# Patient Record
Sex: FEMALE | Race: WHITE | NOT HISPANIC OR LATINO | Employment: UNEMPLOYED | URBAN - METROPOLITAN AREA
[De-identification: names, ages, dates, MRNs, and addresses within clinical notes are randomized per-mention and may not be internally consistent; named-entity substitution may affect disease eponyms.]

---

## 2024-08-01 ENCOUNTER — TELEPHONE (OUTPATIENT)
Dept: SURGERY | Facility: CLINIC | Age: 29
End: 2024-08-01

## 2024-08-01 ENCOUNTER — APPOINTMENT (OUTPATIENT)
Dept: RADIOLOGY | Facility: HOSPITAL | Age: 29
End: 2024-08-01

## 2024-08-01 ENCOUNTER — HOSPITAL ENCOUNTER (EMERGENCY)
Facility: HOSPITAL | Age: 29
Discharge: HOME | End: 2024-08-01

## 2024-08-01 ENCOUNTER — DOCUMENTATION (OUTPATIENT)
Dept: SURGERY | Facility: CLINIC | Age: 29
End: 2024-08-01

## 2024-08-01 ENCOUNTER — APPOINTMENT (OUTPATIENT)
Dept: CARDIOLOGY | Facility: HOSPITAL | Age: 29
End: 2024-08-01

## 2024-08-01 VITALS
SYSTOLIC BLOOD PRESSURE: 122 MMHG | BODY MASS INDEX: 25.76 KG/M2 | WEIGHT: 140 LBS | OXYGEN SATURATION: 99 % | HEIGHT: 62 IN | HEART RATE: 94 BPM | RESPIRATION RATE: 16 BRPM | DIASTOLIC BLOOD PRESSURE: 76 MMHG

## 2024-08-01 DIAGNOSIS — K80.00 CALCULUS OF GALLBLADDER WITH ACUTE CHOLECYSTITIS WITHOUT OBSTRUCTION: Primary | ICD-10-CM

## 2024-08-01 LAB
ALBUMIN SERPL-MCNC: 4.4 G/DL (ref 3.5–5)
ALP BLD-CCNC: 87 U/L (ref 35–125)
ALT SERPL-CCNC: 31 U/L (ref 5–40)
ANION GAP SERPL CALC-SCNC: 10 MMOL/L
APPEARANCE UR: CLEAR
AST SERPL-CCNC: 50 U/L (ref 5–40)
ATRIAL RATE: 69 BPM
BASOPHILS # BLD AUTO: 0.06 X10*3/UL (ref 0–0.1)
BASOPHILS NFR BLD AUTO: 0.5 %
BILIRUB SERPL-MCNC: 0.2 MG/DL (ref 0.1–1.2)
BILIRUB UR STRIP.AUTO-MCNC: NEGATIVE MG/DL
BUN SERPL-MCNC: 10 MG/DL (ref 8–25)
CALCIUM SERPL-MCNC: 9.4 MG/DL (ref 8.5–10.4)
CHLORIDE SERPL-SCNC: 103 MMOL/L (ref 97–107)
CO2 SERPL-SCNC: 23 MMOL/L (ref 24–31)
COLOR UR: ABNORMAL
CREAT SERPL-MCNC: 0.7 MG/DL (ref 0.4–1.6)
EGFRCR SERPLBLD CKD-EPI 2021: >90 ML/MIN/1.73M*2
EOSINOPHIL # BLD AUTO: 0.1 X10*3/UL (ref 0–0.7)
EOSINOPHIL NFR BLD AUTO: 0.9 %
ERYTHROCYTE [DISTWIDTH] IN BLOOD BY AUTOMATED COUNT: 12.2 % (ref 11.5–14.5)
GLUCOSE SERPL-MCNC: 117 MG/DL (ref 65–99)
GLUCOSE UR STRIP.AUTO-MCNC: NORMAL MG/DL
HCG UR QL IA.RAPID: NEGATIVE
HCT VFR BLD AUTO: 41.2 % (ref 36–46)
HGB BLD-MCNC: 13.6 G/DL (ref 12–16)
HOLD SPECIMEN: NORMAL
IMM GRANULOCYTES # BLD AUTO: 0.03 X10*3/UL (ref 0–0.7)
IMM GRANULOCYTES NFR BLD AUTO: 0.3 % (ref 0–0.9)
KETONES UR STRIP.AUTO-MCNC: ABNORMAL MG/DL
LEUKOCYTE ESTERASE UR QL STRIP.AUTO: NEGATIVE
LYMPHOCYTES # BLD AUTO: 2.87 X10*3/UL (ref 1.2–4.8)
LYMPHOCYTES NFR BLD AUTO: 26 %
MAGNESIUM SERPL-MCNC: 2.1 MG/DL (ref 1.6–3.1)
MCH RBC QN AUTO: 31 PG (ref 26–34)
MCHC RBC AUTO-ENTMCNC: 33 G/DL (ref 32–36)
MCV RBC AUTO: 94 FL (ref 80–100)
MONOCYTES # BLD AUTO: 0.95 X10*3/UL (ref 0.1–1)
MONOCYTES NFR BLD AUTO: 8.6 %
NEUTROPHILS # BLD AUTO: 7.02 X10*3/UL (ref 1.2–7.7)
NEUTROPHILS NFR BLD AUTO: 63.7 %
NITRITE UR QL STRIP.AUTO: NEGATIVE
NRBC BLD-RTO: 0 /100 WBCS (ref 0–0)
P AXIS: 6 DEGREES
P OFFSET: 195 MS
P ONSET: 154 MS
PH UR STRIP.AUTO: 6 [PH]
PLATELET # BLD AUTO: 235 X10*3/UL (ref 150–450)
POTASSIUM SERPL-SCNC: 3.8 MMOL/L (ref 3.4–5.1)
PR INTERVAL: 132 MS
PROT SERPL-MCNC: 7.7 G/DL (ref 5.9–7.9)
PROT UR STRIP.AUTO-MCNC: NEGATIVE MG/DL
Q ONSET: 220 MS
QRS COUNT: 12 BEATS
QRS DURATION: 82 MS
QT INTERVAL: 382 MS
QTC CALCULATION(BAZETT): 409 MS
QTC FREDERICIA: 400 MS
R AXIS: 77 DEGREES
RBC # BLD AUTO: 4.39 X10*6/UL (ref 4–5.2)
RBC # UR STRIP.AUTO: NEGATIVE /UL
SODIUM SERPL-SCNC: 136 MMOL/L (ref 133–145)
SP GR UR STRIP.AUTO: 1.02
T AXIS: 52 DEGREES
T OFFSET: 411 MS
UROBILINOGEN UR STRIP.AUTO-MCNC: NORMAL MG/DL
VENTRICULAR RATE: 69 BPM
WBC # BLD AUTO: 11 X10*3/UL (ref 4.4–11.3)

## 2024-08-01 PROCEDURE — 76705 ECHO EXAM OF ABDOMEN: CPT

## 2024-08-01 PROCEDURE — 99285 EMERGENCY DEPT VISIT HI MDM: CPT | Mod: 25

## 2024-08-01 PROCEDURE — 74177 CT ABD & PELVIS W/CONTRAST: CPT

## 2024-08-01 PROCEDURE — 99222 1ST HOSP IP/OBS MODERATE 55: CPT | Performed by: STUDENT IN AN ORGANIZED HEALTH CARE EDUCATION/TRAINING PROGRAM

## 2024-08-01 PROCEDURE — 80053 COMPREHEN METABOLIC PANEL: CPT

## 2024-08-01 PROCEDURE — 36415 COLL VENOUS BLD VENIPUNCTURE: CPT

## 2024-08-01 PROCEDURE — 76705 ECHO EXAM OF ABDOMEN: CPT | Performed by: RADIOLOGY

## 2024-08-01 PROCEDURE — 81003 URINALYSIS AUTO W/O SCOPE: CPT

## 2024-08-01 PROCEDURE — 83735 ASSAY OF MAGNESIUM: CPT

## 2024-08-01 PROCEDURE — 2550000001 HC RX 255 CONTRASTS

## 2024-08-01 PROCEDURE — 85025 COMPLETE CBC W/AUTO DIFF WBC: CPT

## 2024-08-01 PROCEDURE — 93005 ELECTROCARDIOGRAM TRACING: CPT

## 2024-08-01 PROCEDURE — 96375 TX/PRO/DX INJ NEW DRUG ADDON: CPT

## 2024-08-01 PROCEDURE — 74177 CT ABD & PELVIS W/CONTRAST: CPT | Performed by: RADIOLOGY

## 2024-08-01 PROCEDURE — 2500000004 HC RX 250 GENERAL PHARMACY W/ HCPCS (ALT 636 FOR OP/ED)

## 2024-08-01 PROCEDURE — 96361 HYDRATE IV INFUSION ADD-ON: CPT

## 2024-08-01 PROCEDURE — 81025 URINE PREGNANCY TEST: CPT

## 2024-08-01 PROCEDURE — 96374 THER/PROPH/DIAG INJ IV PUSH: CPT

## 2024-08-01 RX ORDER — KETOROLAC TROMETHAMINE 30 MG/ML
15 INJECTION, SOLUTION INTRAMUSCULAR; INTRAVENOUS ONCE
Status: COMPLETED | OUTPATIENT
Start: 2024-08-01 | End: 2024-08-01

## 2024-08-01 RX ORDER — IBUPROFEN 800 MG/1
800 TABLET ORAL 3 TIMES DAILY
Qty: 21 TABLET | Refills: 0 | OUTPATIENT
Start: 2024-08-01 | End: 2024-08-02

## 2024-08-01 RX ORDER — ONDANSETRON 4 MG/1
4 TABLET, FILM COATED ORAL EVERY 6 HOURS
Qty: 12 TABLET | Refills: 0 | Status: SHIPPED | OUTPATIENT
Start: 2024-08-01 | End: 2024-08-04

## 2024-08-01 RX ORDER — ONDANSETRON HYDROCHLORIDE 2 MG/ML
4 INJECTION, SOLUTION INTRAVENOUS ONCE
Status: COMPLETED | OUTPATIENT
Start: 2024-08-01 | End: 2024-08-01

## 2024-08-01 ASSESSMENT — COLUMBIA-SUICIDE SEVERITY RATING SCALE - C-SSRS
1. IN THE PAST MONTH, HAVE YOU WISHED YOU WERE DEAD OR WISHED YOU COULD GO TO SLEEP AND NOT WAKE UP?: NO
6. HAVE YOU EVER DONE ANYTHING, STARTED TO DO ANYTHING, OR PREPARED TO DO ANYTHING TO END YOUR LIFE?: NO
2. HAVE YOU ACTUALLY HAD ANY THOUGHTS OF KILLING YOURSELF?: NO

## 2024-08-01 ASSESSMENT — PAIN DESCRIPTION - LOCATION
LOCATION: ABDOMEN
LOCATION: ABDOMEN

## 2024-08-01 ASSESSMENT — LIFESTYLE VARIABLES
EVER FELT BAD OR GUILTY ABOUT YOUR DRINKING: NO
EVER HAD A DRINK FIRST THING IN THE MORNING TO STEADY YOUR NERVES TO GET RID OF A HANGOVER: NO
HAVE YOU EVER FELT YOU SHOULD CUT DOWN ON YOUR DRINKING: NO
HAVE PEOPLE ANNOYED YOU BY CRITICIZING YOUR DRINKING: NO
TOTAL SCORE: 0

## 2024-08-01 ASSESSMENT — ENCOUNTER SYMPTOMS
HEMATOLOGIC/LYMPHATIC NEGATIVE: 1
ABDOMINAL DISTENTION: 0
ABDOMINAL PAIN: 1
FEVER: 0
ENDOCRINE NEGATIVE: 1
NEUROLOGICAL NEGATIVE: 1
VOMITING: 1
CHILLS: 0
DIARRHEA: 1
EYES NEGATIVE: 1
ALLERGIC/IMMUNOLOGIC NEGATIVE: 1
SHORTNESS OF BREATH: 0
PSYCHIATRIC NEGATIVE: 1
COUGH: 0
CONSTIPATION: 0
MUSCULOSKELETAL NEGATIVE: 1
DIFFICULTY URINATING: 0
NAUSEA: 1
CHEST TIGHTNESS: 0

## 2024-08-01 ASSESSMENT — PAIN DESCRIPTION - ONSET: ONSET: ONGOING

## 2024-08-01 ASSESSMENT — PAIN SCALES - GENERAL
PAINLEVEL_OUTOF10: 4
PAINLEVEL_OUTOF10: 10 - WORST POSSIBLE PAIN

## 2024-08-01 ASSESSMENT — PAIN DESCRIPTION - ORIENTATION: ORIENTATION: MID

## 2024-08-01 ASSESSMENT — PAIN - FUNCTIONAL ASSESSMENT
PAIN_FUNCTIONAL_ASSESSMENT: 0-10
PAIN_FUNCTIONAL_ASSESSMENT: 0-10

## 2024-08-01 ASSESSMENT — PAIN DESCRIPTION - PAIN TYPE
TYPE: ACUTE PAIN
TYPE: ACUTE PAIN

## 2024-08-01 ASSESSMENT — PAIN DESCRIPTION - PROGRESSION: CLINICAL_PROGRESSION: NOT CHANGED

## 2024-08-01 ASSESSMENT — PAIN DESCRIPTION - DESCRIPTORS
DESCRIPTORS: ACHING
DESCRIPTORS: THROBBING

## 2024-08-01 ASSESSMENT — PAIN DESCRIPTION - FREQUENCY: FREQUENCY: CONSTANT/CONTINUOUS

## 2024-08-01 NOTE — TELEPHONE ENCOUNTER
Spoke to pt verified by name/.  Pt states that she is calling to schedule gallbladder surgery   And that she was just seen in the ED. Pt stated that she could have had   Her gallbladder surgery today but she wanted to discuss with her family.   Advised pt that she would need to schedule an appt in the office  And offered to schedule 1st available appt. Pt declined to schedule  Due to appt is 24 and pt stated that Dr. Mendoza advised her that if she comes  Back to the ED today or tomorrow the surgery would be done. Pt stated that she will go back to the  ED today. Pt verbalized understanding and denies further questions.

## 2024-08-01 NOTE — ED PROVIDER NOTES
HPI   Chief Complaint   Patient presents with    Abdominal Pain       HPI  Patient is a 29-year-old female who presents to ED for epigastric pain that has been intermittent over the last 2 days.  Patient reports a history of GERD.  She states this pain is more severe than heartburn.  She complains of associated nausea and vomiting.  Denies diarrhea.  Patient denies any specific complaints of fever or chills, headache or dizziness, cough or congestion, chest pain or shortness of breath, urinary symptoms.  Patient denies any recent hospitalizations or surgeries.  Denies any sick contacts or recent travel.  Denies any history of abdominal surgery.      Patient History   No past medical history on file.  No past surgical history on file.  No family history on file.  Social History     Tobacco Use    Smoking status: Not on file    Smokeless tobacco: Not on file   Substance Use Topics    Alcohol use: Not on file    Drug use: Not on file       Physical Exam   ED Triage Vitals [08/01/24 0827]   Temp Heart Rate Respirations BP   -- 73 16 112/74      Pulse Ox Temp src Heart Rate Source Patient Position   100 % -- -- Sitting      BP Location FiO2 (%)     Left arm --       Physical Exam  Vitals reviewed.   Constitutional:       Appearance: Normal appearance.   HENT:      Head: Normocephalic and atraumatic.   Eyes:      Extraocular Movements: Extraocular movements intact.   Cardiovascular:      Rate and Rhythm: Normal rate and regular rhythm.   Pulmonary:      Effort: Pulmonary effort is normal.      Breath sounds: Normal breath sounds.   Abdominal:      Palpations: Abdomen is soft.      Tenderness: There is no abdominal tenderness.   Musculoskeletal:         General: Normal range of motion.      Cervical back: Normal range of motion and neck supple.   Skin:     General: Skin is warm and dry.   Neurological:      General: No focal deficit present.      Mental Status: She is alert and oriented to person, place, and time.    Psychiatric:         Mood and Affect: Mood normal.         Behavior: Behavior normal.           ED Course & MDM   ED Course as of 08/01/24 1401   Thu Aug 01, 2024   0907 EKG on my interpretation shows normal sinus rhythm, rate of 60 beats minute.  Normal axis.  QTc is 4 9 ms, WI interval 132.  No ST elevation or depression, no acute STEMI pattern.  No STEMI.  Unremarkable EKG. [NT]      ED Course User Index  [NT] Juan Jose FIGUEROA DO Soule[place]ane         Diagnoses as of 08/01/24 1401   Calculus of gallbladder with acute cholecystitis without obstruction                       Daniella Coma Scale Score: 15                        Medical Decision Making  Parts of this chart have been completed using voice recognition software. Please excuse any errors of transcription.  My thought process and reason for plan has been formulated from the time that I saw the patient until the time of disposition and is not specific to one specific moment during their visit and furthermore my MDM encompasses this entire chart and not only this text box.    HPI:   Detailed above.    Exam:   A medically appropriate exam performed, outlined above, given the known history and presentation.    History obtained from:   Patient    EKG/Cardiac monitor:     Social Determinants of Health considered during this visit:       Medications given during visit:  Medications   sodium chloride 0.9 % bolus 1,000 mL (0 mL intravenous Stopped 8/1/24 0955)   ondansetron (Zofran) injection 4 mg (4 mg intravenous Given 8/1/24 0854)   ketorolac (Toradol) injection 15 mg (15 mg intravenous Given 8/1/24 0854)   iohexol (OMNIPaque) 350 mg iodine/mL solution 75 mL (75 mL intravenous Given 8/1/24 0950)        Diagnostic/tests:  Labs Reviewed   COMPREHENSIVE METABOLIC PANEL - Abnormal       Result Value    Glucose 117 (*)     Sodium 136      Potassium 3.8      Chloride 103      Bicarbonate 23 (*)     Urea Nitrogen 10      Creatinine 0.70      eGFR >90      Calcium 9.4       Albumin 4.4      Alkaline Phosphatase 87      Total Protein 7.7      AST 50 (*)     Bilirubin, Total 0.2      ALT 31      Anion Gap 10     URINALYSIS WITH REFLEX CULTURE AND MICROSCOPIC - Abnormal    Color, Urine Light-Yellow      Appearance, Urine Clear      Specific Gravity, Urine 1.019      pH, Urine 6.0      Protein, Urine NEGATIVE      Glucose, Urine Normal      Blood, Urine NEGATIVE      Ketones, Urine TRACE (*)     Bilirubin, Urine NEGATIVE      Urobilinogen, Urine Normal      Nitrite, Urine NEGATIVE      Leukocyte Esterase, Urine NEGATIVE     MAGNESIUM - Normal    Magnesium 2.10     HCG, URINE, QUALITATIVE - Normal    HCG, Urine NEGATIVE     CBC WITH AUTO DIFFERENTIAL    WBC 11.0      nRBC 0.0      RBC 4.39      Hemoglobin 13.6      Hematocrit 41.2      MCV 94      MCH 31.0      MCHC 33.0      RDW 12.2      Platelets 235      Neutrophils % 63.7      Immature Granulocytes %, Automated 0.3      Lymphocytes % 26.0      Monocytes % 8.6      Eosinophils % 0.9      Basophils % 0.5      Neutrophils Absolute 7.02      Immature Granulocytes Absolute, Automated 0.03      Lymphocytes Absolute 2.87      Monocytes Absolute 0.95      Eosinophils Absolute 0.10      Basophils Absolute 0.06     URINALYSIS WITH REFLEX CULTURE AND MICROSCOPIC    Narrative:     The following orders were created for panel order Urinalysis with Reflex Culture and Microscopic.  Procedure                               Abnormality         Status                     ---------                               -----------         ------                     Urinalysis with Reflex C...[[id number]]  Abnormal            Final result               Extra Urine Gray Tube[[id number]]                            In process                   Please view results for these tests on the individual orders.   EXTRA URINE GRAY TUBE      US right upper quadrant   Final Result   Cholelithiasis with mild gallbladder wall thickening. There is no   tenderness upon palpation of  the gallbladder with the ultrasound   transducer. Findings are nonspecific and could relate to either acute   or chronic cholecystitis. Please correlate with clinical and   laboratory assessment for further characterization.        MACRO:   none        Signed by: Javier Willingham 8/1/2024 12:18 PM   Dictation workstation:   [id number]      CT abdomen pelvis w IV contrast   Final Result   Diffuse mild edematous gallbladder wall thickening. Etiology and   significance uncertain as the gallbladder is definitely not dilated.   CT cannot exclude gallstones. For any clinical suspicion for   cholecystitis, obtain ultrasound today        No other potentially acute findings        No biliary duct dilation        Normal appendix        No bowel obstruction, perforation, abscess or pathologic free-fluid        Small degenerating cyst or follicle in right adnexa, not unexpected   in this age group        No acute diverticulitis or other colitis        No hydronephrosis/urinary stone, acute pancreatitis or any other   acute solid organ findings        MACRO:   None        Signed by: Dylan Sharma 8/1/2024 10:17 AM   Dictation workstation:   [id number]           Differential Diagnosis:   As I have deemed necessary from their history, physical and studies, I have considered the following diagnoses:     ABDOMINAL AORTIC ANEURYSM  ACUTE APPENDICITIS  BOWEL OBSTRUCTION  CHOLECYSTITIS  DESCENDING AORTIC DISSECTION  DIVERTICULITIS  INTRABDOMINAL ABSCESS      Consultations:      Procedures:      Critical Care:      Referrals:      Discharge Prescriptions:      MDM Summary:  Patient does have some evidence of cholecystitis.  General surgery was consulted.  Patient was offered surgical removal of the gallbladder.  Patient declined surgery.  Patient states she will return to the ED if her pain returns but currently she is asymptomatic.  Her lab work is unremarkable today.  There is no leukocytosis or anemia.  AST is mildly elevated,  otherwise normal CMP.  No evidence of urinary tract infection.  Urine pregnancy is negative.  Return precautions were discussed.    We have discussed the diagnosis and risks, and we agree with discharging home to follow-up with appropriate physician as directed. We also discussed returning to the Emergency Department immediately if new or worsening symptoms occur. We have discussed the symptoms which are most concerning that necessitate immediate return. Pt symptoms have been well controlled here and the patient is safe for discharge with appropriate outpatient follow up. The patient has verbalized understanding to return to ER without delay for new or worsening pains or for any other symptoms or concerns. I utilized the discharge clinical management tool provided [employer] to help estimate risk of negative outcome for this patient.      Disposition:  The patient was discharged      Procedure  Procedures     Bernabe Canales PA-C  08/01/24 0725

## 2024-08-01 NOTE — PROGRESS NOTES
Subjective   Here for complaints of abdominal pain  Patient ID: Tania Cullen is a 29 y.o. female   2 day history of 10/10 sharp burning epigastric pain that is intermittent and worse with lying flat and bending forward. +Associated symptoms of nausea and vomiting. Has had symptoms similar in past but not to this level of intensity of duration. No fevers or chills. +diarrhea over last 2 weeks. No black or bloody stools. She has tried Pepcid, with some relief, but stopped taking it and her symptoms worsened. She stated that her pain generally improved after meals. But her pain worsened this time and she was afraid to eat. who presents for Never had endoscopic procedures.       States recent dental procedure, and was taking Naprosyn for pain. Has been binge etoh drinking on the weekends, eats spicy foods, and vapes.         Review of Systems   Constitutional:  Negative for chills and fever.   HENT: Negative.     Eyes: Negative.    Respiratory:  Negative for cough, chest tightness and shortness of breath.    Cardiovascular:  Negative for chest pain and leg swelling.   Gastrointestinal:  Positive for abdominal pain, diarrhea, nausea and vomiting. Negative for abdominal distention and constipation.   Endocrine: Negative.    Genitourinary:  Negative for difficulty urinating.   Musculoskeletal: Negative.    Skin: Negative.    Allergic/Immunologic: Negative.    Neurological: Negative.    Hematological: Negative.    Psychiatric/Behavioral: Negative.         Objective   Physical Exam  Constitutional:       Appearance: Normal appearance.   HENT:      Head: Normocephalic and atraumatic.      Right Ear: Tympanic membrane normal.      Nose: Nose normal.      Mouth/Throat:      Mouth: Mucous membranes are moist.   Eyes:      Pupils: Pupils are equal, round, and reactive to light.   Cardiovascular:      Rate and Rhythm: Normal rate and regular rhythm.      Pulses: Normal pulses.   Pulmonary:      Effort: Pulmonary effort is normal.       Breath sounds: Normal breath sounds.   Abdominal:      General: Abdomen is flat. Bowel sounds are normal. There is no distension.      Palpations: Abdomen is soft.      Tenderness: There is abdominal tenderness. There is no guarding.      Hernia: No hernia is present.      Comments: Epigastric tenderness. No peritoneal signs.    Genitourinary:     Rectum: Normal.   Musculoskeletal:         General: Normal range of motion.   Skin:     General: Skin is warm and dry.   Neurological:      General: No focal deficit present.      Mental Status: She is alert.   Psychiatric:         Mood and Affect: Mood normal.         Behavior: Behavior normal.         Assessment/Plan        Cholecystitis vs peptic ulcer disease.     Deepa Horowitz, APRN-CNP 08/01/24 1:40 PM             No past medical history on file.  No past surgical history on file.     Social hx. +vapes. No tobacco products. Binge drinks alcohol on weekends. +Marijuana. Lives with her wife in [place].     Lab Results   Component Value Date    WBC 11.0 08/01/2024    HGB 13.6 08/01/2024    HCT 41.2 08/01/2024    MCV 94 08/01/2024     08/01/2024     Lab Results   Component Value Date    GLUCOSE 117 (H) 08/01/2024    CALCIUM 9.4 08/01/2024     08/01/2024    K 3.8 08/01/2024    CO2 23 (L) 08/01/2024     08/01/2024    BUN 10 08/01/2024    CREATININE 0.70 08/01/2024     === 08/01/24 ===    US RIGHT UPPER QUADRANT    - Impression -  Cholelithiasis with mild gallbladder wall thickening. There is no  tenderness upon palpation of the gallbladder with the ultrasound  transducer. Findings are nonspecific and could relate to either acute  or chronic cholecystitis. Please correlate with clinical and  laboratory assessment for further characterization.    MACRO:  none    Signed by: Javier Willingham 8/1/2024 12:18 PM  Dictation workstation:   [id number]  === 08/01/24 ===    CT ABDOMEN PELVIS W IV CONTRAST    - Impression -  Diffuse mild edematous gallbladder  wall thickening. Etiology and  significance uncertain as the gallbladder is definitely not dilated.  CT cannot exclude gallstones. For any clinical suspicion for  cholecystitis, obtain ultrasound today    No other potentially acute findings    No biliary duct dilation    Normal appendix    No bowel obstruction, perforation, abscess or pathologic free-fluid    Small degenerating cyst or follicle in right adnexa, not unexpected  in this age group    No acute diverticulitis or other colitis    No hydronephrosis/urinary stone, acute pancreatitis or any other  acute solid organ findings    MACRO:  None    Signed by: Dylan Sharma 8/1/2024 10:17 AM  Dictation workstation:   [id number]      Impression/Plan.   Acute cholecystitis vs peptic ulcer disease.   Recommended to follow up with General surgery as outpatient.  Prevacid once daily for two weeks, avoid spicy foods, no vaping, stop NSAIDS.

## 2024-08-01 NOTE — ED TRIAGE NOTES
Pt reports abd pain intermittently for a month. States she was sleeping and it woke her up out of her sleep today. Reports she hasn't been able to keep anything down for 2 days except noodles.

## 2024-08-02 ENCOUNTER — HOSPITAL ENCOUNTER (EMERGENCY)
Facility: HOSPITAL | Age: 29
Discharge: HOME | End: 2024-08-02
Attending: STUDENT IN AN ORGANIZED HEALTH CARE EDUCATION/TRAINING PROGRAM

## 2024-08-02 ENCOUNTER — HOSPITAL ENCOUNTER (EMERGENCY)
Facility: HOSPITAL | Age: 29
Discharge: STILL ADMITTED OR ADMINISTRATIVE | End: 2024-08-02

## 2024-08-02 VITALS
BODY MASS INDEX: 25.76 KG/M2 | HEIGHT: 62 IN | HEART RATE: 74 BPM | OXYGEN SATURATION: 100 % | TEMPERATURE: 98.5 F | RESPIRATION RATE: 18 BRPM | SYSTOLIC BLOOD PRESSURE: 112 MMHG | DIASTOLIC BLOOD PRESSURE: 78 MMHG | WEIGHT: 140 LBS

## 2024-08-02 DIAGNOSIS — K29.70 GASTRITIS WITHOUT BLEEDING, UNSPECIFIED CHRONICITY, UNSPECIFIED GASTRITIS TYPE: ICD-10-CM

## 2024-08-02 DIAGNOSIS — K21.9 GASTROESOPHAGEAL REFLUX DISEASE, UNSPECIFIED WHETHER ESOPHAGITIS PRESENT: Primary | ICD-10-CM

## 2024-08-02 DIAGNOSIS — R10.84 ABDOMINAL PAIN, GENERALIZED: ICD-10-CM

## 2024-08-02 LAB
ALBUMIN SERPL-MCNC: 4.3 G/DL (ref 3.5–5)
ALP BLD-CCNC: 88 U/L (ref 35–125)
ALT SERPL-CCNC: 60 U/L (ref 5–40)
ANION GAP SERPL CALC-SCNC: 13 MMOL/L
AST SERPL-CCNC: 40 U/L (ref 5–40)
BASOPHILS # BLD AUTO: 0.05 X10*3/UL (ref 0–0.1)
BASOPHILS NFR BLD AUTO: 0.6 %
BILIRUB SERPL-MCNC: 0.3 MG/DL (ref 0.1–1.2)
BUN SERPL-MCNC: 6 MG/DL (ref 8–25)
CALCIUM SERPL-MCNC: 8.9 MG/DL (ref 8.5–10.4)
CHLORIDE SERPL-SCNC: 104 MMOL/L (ref 97–107)
CO2 SERPL-SCNC: 21 MMOL/L (ref 24–31)
CREAT SERPL-MCNC: 0.7 MG/DL (ref 0.4–1.6)
EGFRCR SERPLBLD CKD-EPI 2021: >90 ML/MIN/1.73M*2
EOSINOPHIL # BLD AUTO: 0.11 X10*3/UL (ref 0–0.7)
EOSINOPHIL NFR BLD AUTO: 1.3 %
ERYTHROCYTE [DISTWIDTH] IN BLOOD BY AUTOMATED COUNT: 12.2 % (ref 11.5–14.5)
GLUCOSE SERPL-MCNC: 99 MG/DL (ref 65–99)
HCT VFR BLD AUTO: 39.4 % (ref 36–46)
HGB BLD-MCNC: 13.1 G/DL (ref 12–16)
IMM GRANULOCYTES # BLD AUTO: 0.02 X10*3/UL (ref 0–0.7)
IMM GRANULOCYTES NFR BLD AUTO: 0.2 % (ref 0–0.9)
LIPASE SERPL-CCNC: 29 U/L (ref 16–63)
LYMPHOCYTES # BLD AUTO: 2.65 X10*3/UL (ref 1.2–4.8)
LYMPHOCYTES NFR BLD AUTO: 30.6 %
MCH RBC QN AUTO: 31.2 PG (ref 26–34)
MCHC RBC AUTO-ENTMCNC: 33.2 G/DL (ref 32–36)
MCV RBC AUTO: 94 FL (ref 80–100)
MONOCYTES # BLD AUTO: 0.74 X10*3/UL (ref 0.1–1)
MONOCYTES NFR BLD AUTO: 8.6 %
NEUTROPHILS # BLD AUTO: 5.08 X10*3/UL (ref 1.2–7.7)
NEUTROPHILS NFR BLD AUTO: 58.7 %
NRBC BLD-RTO: 0 /100 WBCS (ref 0–0)
PLATELET # BLD AUTO: 235 X10*3/UL (ref 150–450)
POTASSIUM SERPL-SCNC: 3.9 MMOL/L (ref 3.4–5.1)
PROT SERPL-MCNC: 7.3 G/DL (ref 5.9–7.9)
RBC # BLD AUTO: 4.2 X10*6/UL (ref 4–5.2)
SODIUM SERPL-SCNC: 138 MMOL/L (ref 133–145)
WBC # BLD AUTO: 8.7 X10*3/UL (ref 4.4–11.3)

## 2024-08-02 PROCEDURE — 83690 ASSAY OF LIPASE: CPT | Performed by: STUDENT IN AN ORGANIZED HEALTH CARE EDUCATION/TRAINING PROGRAM

## 2024-08-02 PROCEDURE — 4500999001 HC ED NO CHARGE

## 2024-08-02 PROCEDURE — 2500000004 HC RX 250 GENERAL PHARMACY W/ HCPCS (ALT 636 FOR OP/ED): Performed by: STUDENT IN AN ORGANIZED HEALTH CARE EDUCATION/TRAINING PROGRAM

## 2024-08-02 PROCEDURE — 36415 COLL VENOUS BLD VENIPUNCTURE: CPT | Performed by: STUDENT IN AN ORGANIZED HEALTH CARE EDUCATION/TRAINING PROGRAM

## 2024-08-02 PROCEDURE — 96374 THER/PROPH/DIAG INJ IV PUSH: CPT

## 2024-08-02 PROCEDURE — 85025 COMPLETE CBC W/AUTO DIFF WBC: CPT | Performed by: STUDENT IN AN ORGANIZED HEALTH CARE EDUCATION/TRAINING PROGRAM

## 2024-08-02 PROCEDURE — 99232 SBSQ HOSP IP/OBS MODERATE 35: CPT | Performed by: NURSE PRACTITIONER

## 2024-08-02 PROCEDURE — 99284 EMERGENCY DEPT VISIT MOD MDM: CPT

## 2024-08-02 PROCEDURE — 80053 COMPREHEN METABOLIC PANEL: CPT | Performed by: STUDENT IN AN ORGANIZED HEALTH CARE EDUCATION/TRAINING PROGRAM

## 2024-08-02 RX ORDER — OMEPRAZOLE 40 MG/1
40 CAPSULE, DELAYED RELEASE ORAL
Qty: 14 CAPSULE | Refills: 0 | Status: SHIPPED | OUTPATIENT
Start: 2024-08-02 | End: 2024-08-16

## 2024-08-02 RX ORDER — KETOROLAC TROMETHAMINE 30 MG/ML
15 INJECTION, SOLUTION INTRAMUSCULAR; INTRAVENOUS ONCE
Status: COMPLETED | OUTPATIENT
Start: 2024-08-02 | End: 2024-08-02

## 2024-08-02 ASSESSMENT — ENCOUNTER SYMPTOMS
EYES NEGATIVE: 1
COUGH: 0
CHEST TIGHTNESS: 0
ABDOMINAL DISTENTION: 0
DIARRHEA: 0
NAUSEA: 0
CHILLS: 0
ENDOCRINE NEGATIVE: 1
SHORTNESS OF BREATH: 0
MUSCULOSKELETAL NEGATIVE: 1
NEUROLOGICAL NEGATIVE: 1
ALLERGIC/IMMUNOLOGIC NEGATIVE: 1
CONSTIPATION: 0
HEMATOLOGIC/LYMPHATIC NEGATIVE: 1
DIFFICULTY URINATING: 0
ABDOMINAL PAIN: 1
PSYCHIATRIC NEGATIVE: 1
FEVER: 0
VOMITING: 0

## 2024-08-02 ASSESSMENT — PAIN SCALES - GENERAL: PAINLEVEL_OUTOF10: 8

## 2024-08-02 ASSESSMENT — PAIN DESCRIPTION - LOCATION: LOCATION: ABDOMEN

## 2024-08-02 ASSESSMENT — PAIN - FUNCTIONAL ASSESSMENT: PAIN_FUNCTIONAL_ASSESSMENT: 0-10

## 2024-08-02 ASSESSMENT — PAIN DESCRIPTION - PAIN TYPE: TYPE: ACUTE PAIN

## 2024-08-02 NOTE — ED PROVIDER NOTES
Department of Emergency Medicine   ED  Provider Note  Admit Date/RoomTime: 8/2/2024  9:49 AM  ED Room: PSY19/PSY19        History of Present Illness:  Chief Complaint   Patient presents with    Abdominal Pain         Tania Cullen is a 29 y.o. female denies chronic medical illnesses seen evaluated yesterday in the emergency department for upper abdominal pain nausea vomiting.  Complains of nausea no vomiting.  No previous abdominal surgeries.  Last time she had anything to eat was last night.  Drank water this morning.  No previous problems with anesthesia.  General surgery was consulted patient had went home reported she did not want to stay because she wanted to talk to her family about surgery and was advised to return with any worsening symptoms or concerns patient states yesterday her pain was so bad that doubled her over.  It is worse after she eats.  She has had persistent pain since yesterday after talking to family presented today to have her gallbladder removed.    Review of Systems:   Pertinent positives and negatives are stated within HPI, all other systems reviewed and are negative.        --------------------------------------------- PAST HISTORY ---------------------------------------------  Past Medical History:  has no past medical history on file.  Past Surgical History:  has no past surgical history on file.  Social History:    Family History: family history is not on file.. Unless otherwise noted, family history is non contributory  The patient’s home medications have been reviewed.  Allergies: Patient has no known allergies.        ---------------------------------------------------PHYSICAL EXAM--------------------------------------    GENERAL APPEARANCE: Awake and alert.   VITAL SIGNS: As per the nurses' triage record.   HEENT: Normocephalic, atraumatic. Extraocular muscles are intact. Pupils equal round and reactive to light. Conjunctiva are pink. Negative scleral icterus. Mucous membranes are  "moist. Tongue in the midline. Pharynx was without erythema or exudates, uvula midline  NECK: Soft Nontender and supple, full gross ROM, no meningeal signs.  CHEST: Nontender to palpation. Clear to auscultation bilaterally. No rales, rhonchi, or wheezing.   HEART: S1, S2. Regular rate and rhythm. No murmurs, gallops or rubs.  Strong and equal pulses in the extremities.   ABDOMEN: Soft, nontender, nondistended, positive bowel sounds, no palpable masses.  MUSCULCSKELETAL: The calves are nontender to palpation. Full gross active range of motion. Ambulating on own with no acute difficulties  NEUROLOGICAL: Awake, alert and oriented x 3. Power intact in the upper and lower extremities. Sensation is intact to light touch in the upper and lower extremities.   IMMUNOLOGICAL: No lymphatic streaking noted   DERM: No petechiae, rashes, or ecchymoses.          ------------------------- NURSING NOTES AND VITALS REVIEWED ---------------------------  The nursing notes within the ED encounter and vital signs as below have been reviewed by myself  /78 (BP Location: Left arm, Patient Position: Sitting)   Pulse 74   Temp 36.9 °C (98.5 °F) (Oral)   Resp 18   Ht 1.575 m (5' 2\")   Wt 63.5 kg (140 lb)   SpO2 100%   BMI 25.61 kg/m²     Oxygen Saturation Interpretation: 100% room air        The patient’s available past medical records and past encounters were reviewed.          -----------------------DIAGNOSTIC RESULTS------------------------  LABS:    Labs Reviewed   COMPREHENSIVE METABOLIC PANEL - Abnormal       Result Value    Glucose 99      Sodium 138      Potassium 3.9      Chloride 104      Bicarbonate 21 (*)     Urea Nitrogen 6 (*)     Creatinine 0.70      eGFR >90      Calcium 8.9      Albumin 4.3      Alkaline Phosphatase 88      Total Protein 7.3      AST 40      Bilirubin, Total 0.3      ALT 60 (*)     Anion Gap 13     LIPASE - Normal    Lipase 29     CBC WITH AUTO DIFFERENTIAL    WBC 8.7      nRBC 0.0      RBC 4.20   "    Hemoglobin 13.1      Hematocrit 39.4      MCV 94      MCH 31.2      MCHC 33.2      RDW 12.2      Platelets 235      Neutrophils % 58.7      Immature Granulocytes %, Automated 0.2      Lymphocytes % 30.6      Monocytes % 8.6      Eosinophils % 1.3      Basophils % 0.6      Neutrophils Absolute 5.08      Immature Granulocytes Absolute, Automated 0.02      Lymphocytes Absolute 2.65      Monocytes Absolute 0.74      Eosinophils Absolute 0.11      Basophils Absolute 0.05         As interpreted by me, the above displayed labs are abnormal. All other labs obtained during this visit were within normal range or not returned as of this dictation.      ------------------------------ ED COURSE/MEDICAL DECISION MAKING----------------------  Medical Decision Making:   Exam: A medically appropriate exam performed, outlined above, given the known history and presentation.    History obtained from: Review of medical record nursing notes patient      Social Determinants of Health considered during this visit: Lives at home with family presents today with her sister and her wife      PAST MEDICAL HISTORY/Chronic Conditions Affecting Care     has no past medical history on file.       CC/HPI Summary, Social Determinants of health, Records Reviewed, DDx, testing done/not done, ED Course, Reassessment, disposition considerations/shared decision making with patient, consults, disposition:   Presents to the emergency department complaints of abdominal pain nausea  CBC  CMP  Lipase  Toradol  Ultrasound on 8 1 Cholelithiasis with mild gallbladder wall thickening. There is no  tenderness upon palpation of the gallbladder with the ultrasound  transducer. Findings are nonspecific and could relate to either acute  or chronic cholecystitis. Please correlate with clinical and  laboratory assessment for further characterization.  Ultrasound on 8 1  Medical Decision Making/Differential Diagnosis:  Differentials include but not limited to acute  cholecystitis versus biliary colic patient is nontoxic-appearing.  She is not septic.  Abdominal exam is benign.  Electrolytes within normal limits with electrolyte imbalance noted.  Normal renal function noted.  Elevation in ALT otherwise LFTs unremarkable lipase normal no elevation white blood cell count patient is not anemic  Patient medicated for pain here in the emergency department.  Was seen evaluated by surgery team has arranged for outpatient follow-up.  Suspect her symptoms are more related to gastritis.  CT and ultrasound results reviewed from yesterday patient discharged on Prilosec  Patient is amenable to plan amenable to discharge discharge per attending note patient seen evaluated with attending physician Dr. Comer    PROCEDURES  Unless otherwise noted below, none      CONSULTS:   IP CONSULT TO ACUTE CARE SURGERY      Diagnoses as of 08/02/24 1822   Abdominal pain, generalized   Gastritis without bleeding, unspecified chronicity, unspecified gastritis type         This patient has remained hemodynamically stable during their ED course.      Critical Care: none       Counseling:  The emergency provider has spoken with the patient family and discussed today’s results, in addition to providing specific details for the plan of care and counseling regarding the diagnosis and prognosis.  Questions are answered at this time and they are agreeable with the plan.         --------------------------------- IMPRESSION AND DISPOSITION ---------------------------------    IMPRESSION  1. Gastroesophageal reflux disease, unspecified whether esophagitis present    2. Abdominal pain, generalized    3. Gastritis without bleeding, unspecified chronicity, unspecified gastritis type        DISPOSITION  Disposition: Discharge home  Patient condition is stable        NOTE: This report was transcribed using voice recognition software. Every effort was made to ensure accuracy; however, inadvertent computerized transcription  errors may be present      Niki Galvez, MARILYN-CAITY  08/02/24 1821

## 2024-08-02 NOTE — CONSULTS
"  Subjective  Here for complaints of abdominal pain  Patient ID: Tania Cullen is a 29 y.o. female   2 day history of 10/10 sharp burning epigastric pain that is intermittent and worse with lying flat and bending forward. +Associated symptoms of nausea and vomiting. Has had symptoms similar in past but not to this level of intensity of duration. No fevers or chills. +diarrhea over last 2 weeks. No black or bloody stools. She has tried Pepcid, with some relief, but stopped taking it and her symptoms worsened. She stated that her pain generally improved after meals. But her pain worsened this time and she was afraid to eat. who presents for Never had endoscopic procedures.         States recent dental procedure, and was taking Naprosyn for pain. Has been binge etoh drinking on the weekends, eats spicy foods, and vapes.            Medical History   No past medical history on file.     Surgical History   No past surgical history on file.     Social History         Social hx. +vapes. No tobacco products. Binge drinks alcohol on weekends. +Marijuana. Lives with her wife in [place].              8/1/2024     8:27 AM 8/1/2024    12:32 PM   Vitals   Systolic 112 122   Diastolic 74 76   Heart Rate 73 94   Resp 16 16   Height (in) 1.575 m (5' 2\")    Weight (lb) 140    BMI 25.61 kg/m2    BSA (m2) 1.67 m2        Review of Systems   Constitutional:  Negative for chills and fever.   HENT: Negative.     Eyes: Negative.    Respiratory:  Negative for cough, chest tightness and shortness of breath.    Cardiovascular:  Negative for chest pain and leg swelling.   Gastrointestinal:  Positive for abdominal pain, diarrhea, nausea and vomiting. Negative for abdominal distention and constipation.   Endocrine: Negative.    Genitourinary:  Negative for difficulty urinating.   Musculoskeletal: Negative.    Skin: Negative.    Allergic/Immunologic: Negative.    Neurological: Negative.    Hematological: Negative.    Psychiatric/Behavioral: " Negative.                 Objective  Physical Exam  Constitutional:       Appearance: Normal appearance.   HENT:      Head: Normocephalic and atraumatic.      Right Ear: Tympanic membrane normal.      Nose: Nose normal.      Mouth/Throat:      Mouth: Mucous membranes are moist.   Eyes:      Pupils: Pupils are equal, round, and reactive to light.   Cardiovascular:      Rate and Rhythm: Normal rate and regular rhythm.      Pulses: Normal pulses.   Pulmonary:      Effort: Pulmonary effort is normal.      Breath sounds: Normal breath sounds.   Abdominal:      General: Abdomen is flat. Bowel sounds are normal. There is no distension.      Palpations: Abdomen is soft.      Tenderness: There is abdominal tenderness. There is no guarding.      Hernia: No hernia is present.      Comments: Epigastric tenderness. No peritoneal signs. Negative [place]    Genitourinary:     Rectum: Normal.   Musculoskeletal:         General: Normal range of motion.   Skin:     General: Skin is warm and dry.   Neurological:      General: No focal deficit present.      Mental Status: She is alert.   Psychiatric:         Mood and Affect: Mood normal.         Behavior: Behavior normal.         Lab Results   Component Value Date     WBC 11.0 08/01/2024     HGB 13.6 08/01/2024     HCT 41.2 08/01/2024     MCV 94 08/01/2024      08/01/2024            Lab Results   Component Value Date     GLUCOSE 117 (H) 08/01/2024     CALCIUM 9.4 08/01/2024      08/01/2024     K 3.8 08/01/2024     CO2 23 (L) 08/01/2024      08/01/2024     BUN 10 08/01/2024     CREATININE 0.70 08/01/2024      === 08/01/24 ===     US RIGHT UPPER QUADRANT     - Impression -  Cholelithiasis with mild gallbladder wall thickening. There is no  tenderness upon palpation of the gallbladder with the ultrasound  transducer. Findings are nonspecific and could relate to either acute  or chronic cholecystitis. Please correlate with clinical and  laboratory assessment for further  characterization.     MACRO:  none     Signed by: Javier Willingham 8/1/2024 12:18 PM  Dictation workstation:   [id number]  === 08/01/24 ===     CT ABDOMEN PELVIS W IV CONTRAST     - Impression -  Diffuse mild edematous gallbladder wall thickening. Etiology and  significance uncertain as the gallbladder is definitely not dilated.  CT cannot exclude gallstones. For any clinical suspicion for  cholecystitis, obtain ultrasound today     No other potentially acute findings     No biliary duct dilation     Normal appendix     No bowel obstruction, perforation, abscess or pathologic free-fluid     Small degenerating cyst or follicle in right adnexa, not unexpected  in this age group     No acute diverticulitis or other colitis     No hydronephrosis/urinary stone, acute pancreatitis or any other  acute solid organ findings     MACRO:  None     Signed by: Dylan Sharma 8/1/2024 10:17 AM  Dictation workstation:   [id number]        Impression/Plan.   Biliary colic vs peptic ulcer disease.     Likely both are causing her pain. Offered lap irina today however she declined. Will follow up to discuss elective surgery. Prevacid once daily for two weeks, avoid spicy foods, no vaping, stop NSAIDS.

## 2024-08-02 NOTE — ED PROVIDER NOTES
This is a 29-year-old female who presents to the ED for evaluation and management of upper abdominal pain, nausea and vomiting.  She has had symptoms on and off for the past month or so, symptoms got much worse yesterday.  She was seen in the ED and diagnosed with cholecystitis, she talk with the surgeon but was scared of having surgery so she decided to go home and talk to family before making a decision if she wanted to have surgery or not.  She called their office and was advised that the surgeon who spoke with her yesterday would be willing to do the surgery if she came back to the hospital for persistent symptoms.  She returns today due to persistent abdominal pain and nausea.  She has not had any worsening pain, fevers or chills, any urinary symptoms.    Physical Exam  General: well developed, well nourished 29-year-old female who is awake and alert, in no apparent distress  Eyes: sclera clear bilaterally  HENT: normocephalic, atraumatic.   CV: regular rate and rhythm, no murmur, no gallops, or rubs.  Resp: clear to ascultation bilaterally, no wheezes, rales, or rhonchi  GI: abdomen soft, nontender without rigidity or guarding, no peritoneal signs, abdomen is nondistended, no masses palpated. Negative Sen sign.   Psych: Anxious, cooperative with exam  Skin: warm, dry, without evidence of rash or abrasions    MDM/ED course:  She is in no acute distress here, vital signs are normal.  I reviewed her laboratory, imaging workup from her visit yesterday as well as documentation of her call to the surgeons office.  Patient reports persistent symptoms which are not going away.  I do not feel there is a strong indication for repeat imaging today.  I will repeat labs to assess her LFTs, bilirubin, lipase level.  hCG was negative yesterday, I do not feel there is a strong indication to repeat her pregnancy testing today.  I reviewed her right upper quadrant ultrasound performed yesterday which shows cholelithiasis  with gallbladder wall thickening suggestive of either acute or chronic cholecystitis.     Labs show No evidence of pancreatitis, LFTs grossly stable compared to yesterday's values.  No leukocytosis.  The patient's physical exam is completely unremarkable with no tenderness whatsoever.    I spoke with the general surgeon Dr. Mendoza who states that she did not tell the patient to come back and have surgery, she states that the patient's symptoms significantly worsened and she developed intractable nausea or vomiting, fevers, or for quadrant pain, or worsening exam she would need to have her gallbladder taken out.  Otherwise she was post to follow-up in the office for routine cholecystectomy.    The surgical nurse practitioner who works with her assessed the patient in the ED and reinforced this plan with her.  She does not feel the patient requires surgery emergently in the hospital today, she feels that the patient's symptoms are more consistent with gastritis which I agree with.  She started the patient on a PPI, patient was discharged in stable condition with written return precautions given.     Juan Jose Comer,   08/03/24 [id number]

## 2024-08-02 NOTE — CONSULTS
"Assessment/Plan   Low suspicion for acute cholecystis at this time. Neg sonographic and clinical Sen's. Abdominal improved to 4/10 overnight, despite eating greasy foods last night. No leukocytosis. No elevated lipase. No other suggestive symptoms today of GB etiology.     However there is Concern for GERD/peptic ulcer disease. Omeprazole script given to pt in person. Pt was instructed to avoid \"CANS\"   Caffeine, alcohol, nicotine, NSAIDS, Spicy foods. Avoid greasy foods.   Take omeprazole daily for two weeks. If symptoms do not improve, call the office for an apt.   Inpatient consult to Acute Care Surgery  Consult performed by: Deepa Horowitz, MARILYN-CNP  Consult ordered by: Juan Jose Comer DO  Reason for consult: possible cholecystitis        Subjective   On 8/1/24 we recorded in the ED that the pt had \"2 day history of 10/10 sharp burning epigastric pain that is intermittent and worse with lying flat and bending forward. +Associated symptoms of nausea and vomiting. Has had symptoms similar in past but not to this level of intensity of duration. No fevers or chills. +diarrhea over last 2 weeks. No black or bloody stools. She has tried Pepcid, with some relief, but stopped taking it and her symptoms worsened. She stated that her pain generally improved after meals. But her pain worsened this time and she was afraid to eat. who presents for Never had endoscopic procedures. States recent dental procedure, and was taking Naprosyn for pain. Has been binge etoh drinking on the weekends, eats spicy foods, and vapes.\"    Pt presents once again today, due to family pressures, to come in and get her gallbladder removed. We spoke at length yesterday about her social habits, and possibility of Peptic ulcer disease vs cholecystitis. The pt did not have a chance to start her PPI that I recommended. She was discharged from the ED before she could have been given a prescription. Her symptoms have improved after stopping " alcohol, NSAIDS and vaping. We discussed medical management. The pt, the pt's wife, and the sister are satisfied after a thorough discussion.         Abdominal Pain  Pertinent negatives include no constipation, diarrhea, fever, nausea or vomiting.       Review of Systems  Review of Systems   Constitutional:  Negative for chills and fever.   HENT: Negative.     Eyes: Negative.    Respiratory:  Negative for cough, chest tightness and shortness of breath.    Cardiovascular:  Negative for chest pain and leg swelling.   Gastrointestinal:  Positive for abdominal pain. Negative for abdominal distention, constipation, diarrhea, nausea and vomiting.   Endocrine: Negative.    Genitourinary:  Negative for difficulty urinating.   Musculoskeletal: Negative.    Skin: Negative.    Allergic/Immunologic: Negative.    Neurological: Negative.    Hematological: Negative.    Psychiatric/Behavioral: Negative.         Objective     Vital signs for last 24 hours:  Temperature:  [36.9 °C (98.5 °F)] 36.9 °C (98.5 °F)  Heart Rate:  [74] 74  Respirations:  [18] 18  BP: (112)/(78) 112/78    Intake/Output this shift:  No intake/output data recorded.    Physical Exam  Physical Exam  Constitutional:       Appearance: Normal appearance.   HENT:      Head: Normocephalic and atraumatic.      Right Ear: Tympanic membrane normal.      Nose: Nose normal.      Mouth/Throat:      Mouth: Mucous membranes are moist.   Eyes:      Pupils: Pupils are equal, round, and reactive to light.   Cardiovascular:      Rate and Rhythm: Normal rate and regular rhythm.      Pulses: Normal pulses.   Pulmonary:      Effort: Pulmonary effort is normal.      Breath sounds: Normal breath sounds.   Abdominal:      General: Bowel sounds are normal. There is no distension.      Palpations: Abdomen is soft.      Tenderness: There is no abdominal tenderness. There is no guarding.      Hernia: No hernia is present.      Comments: Neg lees's, no right upper quadrant tenderness.  Some mild epigastric tenderness.   No peritoneal signs.    Genitourinary:     Rectum: Normal.   Musculoskeletal:         General: Normal range of motion.   Skin:     General: Skin is warm and dry.   Neurological:      General: No focal deficit present.      Mental Status: She is alert.   Psychiatric:         Mood and Affect: Mood normal.         Behavior: Behavior normal.         Labs  CBC:   Lab Results   Component Value Date    WBC 8.7 08/02/2024    RBC 4.20 08/02/2024     BMP:   Lab Results   Component Value Date    GLUCOSE 99 08/02/2024    CO2 21 (L) 08/02/2024    BUN 6 (L) 08/02/2024    CREATININE 0.70 08/02/2024    CALCIUM 8.9 08/02/2024   === 08/01/24 ===    CT ABDOMEN PELVIS W IV CONTRAST    - Impression -  Diffuse mild edematous gallbladder wall thickening. Etiology and  significance uncertain as the gallbladder is definitely not dilated.  CT cannot exclude gallstones. For any clinical suspicion for  cholecystitis, obtain ultrasound today    No other potentially acute findings    No biliary duct dilation    Normal appendix    No bowel obstruction, perforation, abscess or pathologic free-fluid    Small degenerating cyst or follicle in right adnexa, not unexpected  in this age group    No acute diverticulitis or other colitis    No hydronephrosis/urinary stone, acute pancreatitis or any other  acute solid organ findings    MACRO:  None    Signed by: Dylan Sharma 8/1/2024 10:17 AM  Dictation workstation:   [id number]  === 08/01/24 ===    US RIGHT UPPER QUADRANT    - Impression -  Cholelithiasis with mild gallbladder wall thickening. There is no  tenderness upon palpation of the gallbladder with the ultrasound  transducer. Findings are nonspecific and could relate to either acute  or chronic cholecystitis. Please correlate with clinical and  laboratory assessment for further characterization.    MACRO:  none    Signed by: Javier Willingham 8/1/2024 12:18 PM  Dictation workstation:   [id number]

## 2024-08-02 NOTE — ED TRIAGE NOTES
Pt is here for possible gall bladder removal after a work up that was done here in ED, pt left AMA but decided to return today. Pt complaint is of 8/10 abdominal pain. Pt is visibly emotionally upset but is willing to receive care.

## 2024-08-02 NOTE — DISCHARGE INSTRUCTIONS
"Schedule an appointment with the surgeon in the next 24-48 hours to arrange follow up and continued management of your symptoms. Return to the ED for any new or concerning symptoms including but not limited to severe worsening of your pain, inability to tolrate oral solids or fluids due to worsening pain or severe nausea and vomiting, fevers over 100.4 Fahrenheit, if you develop jaundice such as yellowing of the skin or eyes.  All of the symptoms would require reassessment by physician immediately.    Symptoms of gastritis including nausea, vomiting, central upper abdominal tenderness can be managed with medications like Prilosec, symptoms can improved by stopping the use of NSAID anti-inflammatory medications like ibuprofen and Aleve, stopping the use of alcohol, tobacco and cigarettes can also cause irritation of the stomach like this and lead to ulcers which could create similar symptoms to what you are experiencing now    Pt was instructed to avoid \"CANS\"   Caffeine, alcohol, nicotine, NSAIDS, Spicy foods. Take omeprazole daily for two weeks. If symptoms do not improve, call the office for an apt. .  "

## 2024-10-06 ENCOUNTER — HOSPITAL ENCOUNTER (EMERGENCY)
Facility: HOSPITAL | Age: 29
Discharge: HOME | End: 2024-10-06

## 2024-10-06 ENCOUNTER — APPOINTMENT (OUTPATIENT)
Dept: RADIOLOGY | Facility: HOSPITAL | Age: 29
End: 2024-10-06

## 2024-10-06 VITALS
OXYGEN SATURATION: 100 % | TEMPERATURE: 98.2 F | BODY MASS INDEX: 25.76 KG/M2 | HEART RATE: 79 BPM | DIASTOLIC BLOOD PRESSURE: 75 MMHG | WEIGHT: 140 LBS | RESPIRATION RATE: 18 BRPM | HEIGHT: 62 IN | SYSTOLIC BLOOD PRESSURE: 111 MMHG

## 2024-10-06 DIAGNOSIS — Z87.19 HISTORY OF BILIARY COLIC: Primary | ICD-10-CM

## 2024-10-06 LAB
ALBUMIN SERPL BCP-MCNC: 4.8 G/DL (ref 3.4–5)
ALP SERPL-CCNC: 63 U/L (ref 33–110)
ALT SERPL W P-5'-P-CCNC: 9 U/L (ref 7–45)
ANION GAP SERPL CALCULATED.3IONS-SCNC: 10 MMOL/L (ref 10–20)
APPEARANCE UR: CLEAR
AST SERPL W P-5'-P-CCNC: 11 U/L (ref 9–39)
BASOPHILS # BLD AUTO: 0.06 X10*3/UL (ref 0–0.1)
BASOPHILS NFR BLD AUTO: 0.6 %
BILIRUB SERPL-MCNC: 0.7 MG/DL (ref 0–1.2)
BILIRUB UR STRIP.AUTO-MCNC: NEGATIVE MG/DL
BUN SERPL-MCNC: 7 MG/DL (ref 6–23)
CALCIUM SERPL-MCNC: 9.7 MG/DL (ref 8.6–10.3)
CHLORIDE SERPL-SCNC: 104 MMOL/L (ref 98–107)
CO2 SERPL-SCNC: 28 MMOL/L (ref 21–32)
COLOR UR: ABNORMAL
CREAT SERPL-MCNC: 0.77 MG/DL (ref 0.5–1.05)
EGFRCR SERPLBLD CKD-EPI 2021: >90 ML/MIN/1.73M*2
EOSINOPHIL # BLD AUTO: 0.11 X10*3/UL (ref 0–0.7)
EOSINOPHIL NFR BLD AUTO: 1.2 %
ERYTHROCYTE [DISTWIDTH] IN BLOOD BY AUTOMATED COUNT: 13 % (ref 11.5–14.5)
GLUCOSE SERPL-MCNC: 88 MG/DL (ref 74–99)
GLUCOSE UR STRIP.AUTO-MCNC: NORMAL MG/DL
HCG UR QL IA.RAPID: NEGATIVE
HCT VFR BLD AUTO: 42.8 % (ref 36–46)
HGB BLD-MCNC: 13.9 G/DL (ref 12–16)
IMM GRANULOCYTES # BLD AUTO: 0.03 X10*3/UL (ref 0–0.7)
IMM GRANULOCYTES NFR BLD AUTO: 0.3 % (ref 0–0.9)
KETONES UR STRIP.AUTO-MCNC: NEGATIVE MG/DL
LEUKOCYTE ESTERASE UR QL STRIP.AUTO: NEGATIVE
LIPASE SERPL-CCNC: 13 U/L (ref 9–82)
LYMPHOCYTES # BLD AUTO: 3.81 X10*3/UL (ref 1.2–4.8)
LYMPHOCYTES NFR BLD AUTO: 40 %
MCH RBC QN AUTO: 31.7 PG (ref 26–34)
MCHC RBC AUTO-ENTMCNC: 32.5 G/DL (ref 32–36)
MCV RBC AUTO: 98 FL (ref 80–100)
MONOCYTES # BLD AUTO: 0.66 X10*3/UL (ref 0.1–1)
MONOCYTES NFR BLD AUTO: 6.9 %
NEUTROPHILS # BLD AUTO: 4.85 X10*3/UL (ref 1.2–7.7)
NEUTROPHILS NFR BLD AUTO: 51 %
NITRITE UR QL STRIP.AUTO: NEGATIVE
NRBC BLD-RTO: 0 /100 WBCS (ref 0–0)
PH UR STRIP.AUTO: 7 [PH]
PLATELET # BLD AUTO: 251 X10*3/UL (ref 150–450)
POTASSIUM SERPL-SCNC: 3.6 MMOL/L (ref 3.5–5.3)
PROT SERPL-MCNC: 7.9 G/DL (ref 6.4–8.2)
PROT UR STRIP.AUTO-MCNC: NEGATIVE MG/DL
RBC # BLD AUTO: 4.39 X10*6/UL (ref 4–5.2)
RBC # UR STRIP.AUTO: NEGATIVE /UL
SODIUM SERPL-SCNC: 138 MMOL/L (ref 136–145)
SP GR UR STRIP.AUTO: 1.02
UROBILINOGEN UR STRIP.AUTO-MCNC: ABNORMAL MG/DL
WBC # BLD AUTO: 9.5 X10*3/UL (ref 4.4–11.3)

## 2024-10-06 PROCEDURE — 96374 THER/PROPH/DIAG INJ IV PUSH: CPT

## 2024-10-06 PROCEDURE — 84075 ASSAY ALKALINE PHOSPHATASE: CPT | Performed by: PHYSICIAN ASSISTANT

## 2024-10-06 PROCEDURE — 96375 TX/PRO/DX INJ NEW DRUG ADDON: CPT

## 2024-10-06 PROCEDURE — 76705 ECHO EXAM OF ABDOMEN: CPT

## 2024-10-06 PROCEDURE — 76705 ECHO EXAM OF ABDOMEN: CPT | Performed by: STUDENT IN AN ORGANIZED HEALTH CARE EDUCATION/TRAINING PROGRAM

## 2024-10-06 PROCEDURE — 81003 URINALYSIS AUTO W/O SCOPE: CPT | Performed by: PHYSICIAN ASSISTANT

## 2024-10-06 PROCEDURE — 36415 COLL VENOUS BLD VENIPUNCTURE: CPT | Performed by: PHYSICIAN ASSISTANT

## 2024-10-06 PROCEDURE — 81025 URINE PREGNANCY TEST: CPT | Performed by: PHYSICIAN ASSISTANT

## 2024-10-06 PROCEDURE — 2500000004 HC RX 250 GENERAL PHARMACY W/ HCPCS (ALT 636 FOR OP/ED): Performed by: PHYSICIAN ASSISTANT

## 2024-10-06 PROCEDURE — 99284 EMERGENCY DEPT VISIT MOD MDM: CPT

## 2024-10-06 PROCEDURE — 83690 ASSAY OF LIPASE: CPT | Performed by: PHYSICIAN ASSISTANT

## 2024-10-06 PROCEDURE — 85025 COMPLETE CBC W/AUTO DIFF WBC: CPT | Performed by: PHYSICIAN ASSISTANT

## 2024-10-06 RX ORDER — KETOROLAC TROMETHAMINE 30 MG/ML
15 INJECTION, SOLUTION INTRAMUSCULAR; INTRAVENOUS ONCE
Status: COMPLETED | OUTPATIENT
Start: 2024-10-06 | End: 2024-10-06

## 2024-10-06 RX ORDER — ONDANSETRON HYDROCHLORIDE 2 MG/ML
4 INJECTION, SOLUTION INTRAVENOUS ONCE
Status: COMPLETED | OUTPATIENT
Start: 2024-10-06 | End: 2024-10-06

## 2024-10-06 ASSESSMENT — PAIN SCALES - GENERAL
PAINLEVEL_OUTOF10: 7
PAINLEVEL_OUTOF10: 2
PAINLEVEL_OUTOF10: 6

## 2024-10-06 ASSESSMENT — COLUMBIA-SUICIDE SEVERITY RATING SCALE - C-SSRS
6. HAVE YOU EVER DONE ANYTHING, STARTED TO DO ANYTHING, OR PREPARED TO DO ANYTHING TO END YOUR LIFE?: NO
2. HAVE YOU ACTUALLY HAD ANY THOUGHTS OF KILLING YOURSELF?: NO
1. IN THE PAST MONTH, HAVE YOU WISHED YOU WERE DEAD OR WISHED YOU COULD GO TO SLEEP AND NOT WAKE UP?: NO

## 2024-10-06 ASSESSMENT — PAIN DESCRIPTION - PAIN TYPE: TYPE: CHRONIC PAIN

## 2024-10-06 ASSESSMENT — PAIN - FUNCTIONAL ASSESSMENT
PAIN_FUNCTIONAL_ASSESSMENT: 0-10
PAIN_FUNCTIONAL_ASSESSMENT: 0-10

## 2024-10-06 ASSESSMENT — PAIN DESCRIPTION - LOCATION: LOCATION: ABDOMEN

## 2024-10-06 NOTE — DISCHARGE INSTRUCTIONS
Follow-up with the general surgery and gastroenterology listed on your discharge paperwork.  I do recommend you continue Prilosec daily.  Please avoid greasy, acidic or spicy foods.  If you begin to have nausea, vomiting, abdominal pain, fever, chills, diarrhea return to the emergency department immediately for reevaluation.  Your ultrasound today does show gallstones but no evidence of acute infection of your gallbladder.  Your labs are reassuring today.  I did speak with general surgery today and you do not need emergent removal of your gallbladder today.

## 2024-10-06 NOTE — ED TRIAGE NOTES
Pt reports that she was supposed to have her gallbladder removed (approx 45 days ago), never followed up with the procedure, has abd discomfort today.  AOx4

## 2024-10-06 NOTE — Clinical Note
Tania Cullen was seen and treated in our emergency department on 10/6/2024.  She may return to work on 10/08/2024.  Please excuse patient from work yesterday, 10/7/2024.  She is dealing with GI issues.     If you have any questions or concerns, please don't hesitate to call.      Nayana Lamb PA-C

## 2024-10-07 LAB — HOLD SPECIMEN: NORMAL

## 2024-10-08 NOTE — ED PROVIDER NOTES
HPI   Chief Complaint   Patient presents with    Abdominal Pain       This is a 29-year-old female presenting to the emergency department with complaints of needing a work note due to missing work this past Tuesday secondary to abdominal pain due to known biliary colic.  Patient states she is presenting because she would like to have her gallbladder removed..  Patient has been evaluated for upper abdominal discomfort.  She has been established with general surgery and did have an appointment to discuss elective removal of her gallbladder but has not followed up.  I did review patient's previous ED encounters, she has had CT scan and ultrasound done.  Patient states today she is not having any abdominal discomfort.  She has been eating and drinking normally with no nausea, vomiting, pain with eating or after eating, fever, chills, yellowing of her skin, diarrhea or constipation.  Patient states that she will get episodes of right upper abdominal discomfort that will come and go, and she had a attack this past Tuesday causing her to miss work. .  She denies any previous abdominal surgeries.  She denies any GERD, she is taking Prilosec daily.  No urinary complaints including dysuria, hematuria, increased urgency or frequency, no chest pain or back pain.  She denies bloody stool or melena.        Please see HPI for pertinent positive and negative ROS.       Patient History   No past medical history on file.  No past surgical history on file.  No family history on file.  Social History     Tobacco Use    Smoking status: Not on file    Smokeless tobacco: Not on file   Substance Use Topics    Alcohol use: Not on file    Drug use: Not on file       Physical Exam   ED Triage Vitals [10/06/24 1443]   Temperature Heart Rate Respirations BP   36.8 °C (98.2 °F) 88 15 115/72      Pulse Ox Temp Source Heart Rate Source Patient Position   100 % Temporal -- Sitting      BP Location FiO2 (%)     Left arm --       Physical  Exam  GENERAL APPEARANCE: Awake and alert. No acute respiratory distress.  Patient is sitting very comfortably in exam room chair without any discomfort.  VITAL SIGNS: As per the nurses' triage record.  HEENT: Normocephalic, atraumatic. Extraocular muscles are intact. Conjunctiva are pink. Negative scleral icterus. Mucous membranes are moist. Tongue in the midline.   NECK: Soft, nontender and supple  CHEST: Nontender to palpation. Clear to auscultation bilaterally. No rales, rhonchi, or wheezing. Symmetric rise and fall of chest wall.   HEART: Clear S1 and S2. Regular rate and rhythm. No murmurs appreciated on auscultation.  Strong and equal pulses in the extremities.  ABDOMEN: Soft, nontender, nondistended, positive bowel sounds, no palpable masses.  Negative Sen sign.  No CVA tenderness bilaterally.  MUSCULOSKELETAL: The calves are nontender to palpation. Full gross active range of motion. Ambulating on own with no acute difficulties  NEUROLOGICAL: Awake, alert and oriented x 3. Motor power intact in the upper and lower extremities. Sensation is intact to light touch in the upper and lower extremities. Patient answering questions appropriately.   IMMUNOLOGICAL: No lymphatic streaking noted  DERMATOLOGIC: Warm and dry   PYSCH: Cooperative with appropriate mood and affect.    ED Course & MDM   Diagnoses as of 10/08/24 1708   History of biliary colic                 No data recorded     Daniella Coma Scale Score: 15 (10/06/24 1443 : Robles Harris, EMT)                           Medical Decision Making  Parts of this chart have been completed using voice recognition software. Please excuse any errors of transcription.  My thought process and reason for plan has been formulated from the time that I saw the patient until the time of disposition and is not specific to one specific moment during their visit and furthermore my MDM encompasses this entire chart and not only this text box.      HPI: Detailed  above.    Exam: A medically appropriate exam performed, outlined above, given the known history and presentation.    History obtained from: Patient    Medications given during visit:  Medications   sodium chloride 0.9 % bolus 500 mL (0 mL intravenous Stopped 10/6/24 1542)   ketorolac (Toradol) injection 15 mg (15 mg intravenous Given 10/6/24 1529)   ondansetron (Zofran) injection 4 mg (4 mg intravenous Given 10/6/24 1530)        Diagnostic/tests  Labs Reviewed   URINALYSIS WITH REFLEX CULTURE AND MICROSCOPIC - Abnormal       Result Value    Color, Urine Light-Yellow      Appearance, Urine Clear      Specific Gravity, Urine 1.020      pH, Urine 7.0      Protein, Urine NEGATIVE      Glucose, Urine Normal      Blood, Urine NEGATIVE      Ketones, Urine NEGATIVE      Bilirubin, Urine NEGATIVE      Urobilinogen, Urine 2 (1+) (*)     Nitrite, Urine NEGATIVE      Leukocyte Esterase, Urine NEGATIVE     COMPREHENSIVE METABOLIC PANEL - Normal    Glucose 88      Sodium 138      Potassium 3.6      Chloride 104      Bicarbonate 28      Anion Gap 10      Urea Nitrogen 7      Creatinine 0.77      eGFR >90      Calcium 9.7      Albumin 4.8      Alkaline Phosphatase 63      Total Protein 7.9      AST 11      Bilirubin, Total 0.7      ALT 9     HCG, URINE, QUALITATIVE - Normal    HCG, Urine NEGATIVE     LIPASE - Normal    Lipase 13      Narrative:     Venipuncture immediately after or during the administration of Metamizole may lead to falsely low results. Testing should be performed immediately prior to Metamizole dosing.   CBC WITH AUTO DIFFERENTIAL    WBC 9.5      nRBC 0.0      RBC 4.39      Hemoglobin 13.9      Hematocrit 42.8      MCV 98      MCH 31.7      MCHC 32.5      RDW 13.0      Platelets 251      Neutrophils % 51.0      Immature Granulocytes %, Automated 0.3      Lymphocytes % 40.0      Monocytes % 6.9      Eosinophils % 1.2      Basophils % 0.6      Neutrophils Absolute 4.85      Immature Granulocytes Absolute, Automated  0.03      Lymphocytes Absolute 3.81      Monocytes Absolute 0.66      Eosinophils Absolute 0.11      Basophils Absolute 0.06     URINALYSIS WITH REFLEX CULTURE AND MICROSCOPIC    Narrative:     The following orders were created for panel order Urinalysis with Reflex Culture and Microscopic.  Procedure                               Abnormality         Status                     ---------                               -----------         ------                     Urinalysis with Reflex C...[[id number]]  Abnormal            Final result               Extra Urine Gray Tube[[id number]]                            Final result                 Please view results for these tests on the individual orders.   EXTRA URINE GRAY TUBE    Extra Tube Hold for add-ons.        US right upper quadrant   Final Result   Sludge versus small calculi within the contracted gallbladder. No   evidence of acute cholecystitis or biliary dilatation.        Signed by: Nathanael Curran 10/6/2024 7:36 PM   Dictation workstation:   [id number]           Considerations/further MDM:    The patient presents with stable vital signs.  Blood pressure 115/72, temperature 98.2 °F, heart rate 88 bpm, respiration 15, 100 cm    Differential diagnosis includes but not limited to acute cholecystitis versus cholangitis versus biliary colic versus gastritis versus peptic ulcer disease versus muscular pain versus slow motility    Lipase 13, CMP shows no electro disturbances, no acute kidney injury.  Alk phos 63, AST 11, ALT 9, total bilirubin 0.7.  Urinalysis shows no evidence of urinary tract infection.  CBC shows no leukocytosis, neutrophils not elevated.  Negative pregnancy test.  Right upper quadrant ultrasound shows sludge versus small calculi within the contracted gallbladder.  There is no evidence of acute cholecystitis or biliary dilation.    Patient states that over the last month that she has been experiencing episodes of right upper quadrant pain that come  and go.  She is currently not having the pain today but had a previous episode this past Tuesday prompting her to miss work.  She did miss an appointment with general surgery to discuss elective removal of the gallbladder after previous ER visit and she is presenting today to see if she can have her gallbladder removed.  At this time, there is no evidence of acute cholecystitis.  Her labs are unremarkable.  Her abdominal exam is benign today, she is not having any abdominal discomfort.  I did review previous CT imaging of abdomen pelvis and do not feel repeat imaging is clinically indicated.  Patient does not want to proceed with repeat CT imaging of her abdomen pelvis today.  I did discuss patient's labs and ultrasound today with Dr. Burton states patient can follow-up with him for discussion of elective removal of gallbladder.  I do have very low suspicion that there is other pathology in her abdomen including pancreatitis, appendicitis, bowel obstruction, peptic ulcer disease with perforation due to patient's history and physical exam findings.  Patient was educated to continue Prilosec.  She was educated to avoid greasy, acidic or spicy foods.  She was given very strict return precautions to the emergency department.  A stat referral was placed general surgery.  Patient felt comfortable discharge plan home.  She was discharged in stable condition.      Procedure  Procedures     Nayana Lamb PA-C  10/08/24 1622

## 2024-10-10 ENCOUNTER — HOSPITAL ENCOUNTER (OUTPATIENT)
Facility: HOSPITAL | Age: 29
Setting detail: OUTPATIENT SURGERY
End: 2024-10-10
Attending: SURGERY | Admitting: SURGERY
Payer: COMMERCIAL

## 2024-10-10 ENCOUNTER — OFFICE VISIT (OUTPATIENT)
Dept: SURGERY | Facility: CLINIC | Age: 29
End: 2024-10-10

## 2024-10-10 VITALS — HEIGHT: 62 IN | WEIGHT: 140 LBS | BODY MASS INDEX: 25.76 KG/M2 | TEMPERATURE: 97.8 F

## 2024-10-10 DIAGNOSIS — Z01.818 ENCOUNTER FOR PREOPERATIVE EXAMINATION FOR GENERAL SURGICAL PROCEDURE: ICD-10-CM

## 2024-10-10 DIAGNOSIS — Z87.19 HISTORY OF BILIARY COLIC: ICD-10-CM

## 2024-10-10 DIAGNOSIS — K21.9 GASTROESOPHAGEAL REFLUX DISEASE WITHOUT ESOPHAGITIS: ICD-10-CM

## 2024-10-10 DIAGNOSIS — K80.71 CALCULUS OF GALLBLADDER AND BILE DUCT WITH OBSTRUCTION WITHOUT CHOLECYSTITIS: ICD-10-CM

## 2024-10-10 PROCEDURE — 3008F BODY MASS INDEX DOCD: CPT | Performed by: SURGERY

## 2024-10-10 PROCEDURE — 99203 OFFICE O/P NEW LOW 30 MIN: CPT | Performed by: SURGERY

## 2024-10-10 ASSESSMENT — ENCOUNTER SYMPTOMS
APPETITE CHANGE: 0
CHILLS: 0
CONFUSION: 0
DIZZINESS: 0
ACTIVITY CHANGE: 0
SHORTNESS OF BREATH: 0
FEVER: 0
DIAPHORESIS: 0
AGITATION: 0

## 2024-10-10 NOTE — PROGRESS NOTES
Subjective   Patient ID: Tania Cullen is a 29 y.o. female who presents for No chief complaint on file..    The patient is a 29-year-old woman with no significant past medical history who presents for evaluation of postprandial right upper quadrant abdominal pain and epigastric abdominal pain.  She has been seen in the emergency department a few times now over the last 2 months for similar issues.  Initially she was scheduled for a laparoscopic cholecystectomy, but did not feel comfortable staying in the hospital.  Since then she has had less severe episodes of recurrent abdominal pains.  She was also prescribed some omeprazole, and has noticed some improvement in her symptoms since starting the medication.  She was taking naproxen for a tooth abscess.  She also drinks alcohol on the weekends and vapes on a daily basis.  She was concerned that she may have an ulcer due to the NSAID use and smoking.  Her LFTs have been normal    Review of Systems   Constitutional:  Negative for activity change, appetite change, chills, diaphoresis and fever.   Respiratory:  Negative for shortness of breath.    Cardiovascular:  Negative for chest pain.   Neurological:  Negative for dizziness.   Psychiatric/Behavioral:  Negative for agitation and confusion.    All other systems reviewed and are negative.      Objective   Physical Exam  Vitals reviewed.   HENT:      Head: Normocephalic.   Cardiovascular:      Rate and Rhythm: Normal rate.   Pulmonary:      Effort: Pulmonary effort is normal.   Abdominal:      Palpations: Abdomen is soft.   Neurological:      General: No focal deficit present.      Mental Status: She is alert.   Psychiatric:         Mood and Affect: Mood normal.         Behavior: Behavior normal.         Assessment/Plan   The patient is a 29-year-old woman who presents for evaluation of biliary colic.  She has had multiple, recurrent episodes of epigastric and right upper quadrant abdominal pain.  She has also been using  naproxen and smoking raising concern for possible peptic ulcer disease.  I discussed with her the risks and benefits of going to operating room for a laparoscopic cholecystectomy as well as an upper endoscopy.  These risk include risk of bleeding, infection, injury to the bile ducts, or injury to surround structures.  The patient agrees to proceed with the operation.  We will get her scheduled in the coming weeks.  Advised her to continue taking the omeprazole.         Jeovany Burton MD 10/10/24 3:20 PM

## 2024-10-24 ENCOUNTER — APPOINTMENT (OUTPATIENT)
Dept: PREADMISSION TESTING | Facility: HOSPITAL | Age: 29
End: 2024-10-24
Payer: MEDICAID

## 2024-11-14 ENCOUNTER — APPOINTMENT (OUTPATIENT)
Dept: SURGERY | Facility: CLINIC | Age: 29
End: 2024-11-14

## 2024-11-20 RX ORDER — CEFAZOLIN SODIUM 2 G/100ML
2 INJECTION, SOLUTION INTRAVENOUS ONCE
OUTPATIENT
Start: 2024-11-20 | End: 2024-11-20

## 2024-12-09 ENCOUNTER — APPOINTMENT (OUTPATIENT)
Dept: SURGERY | Facility: CLINIC | Age: 29
End: 2024-12-09
Payer: MEDICAID

## 2024-12-10 ENCOUNTER — APPOINTMENT (OUTPATIENT)
Dept: SURGERY | Facility: CLINIC | Age: 29
End: 2024-12-10
Payer: MEDICAID

## 2024-12-11 ENCOUNTER — APPOINTMENT (OUTPATIENT)
Dept: SURGERY | Facility: CLINIC | Age: 29
End: 2024-12-11
Payer: MEDICAID

## (undated) DEVICE — GLOVE, SURGICAL, PROTEXIS PI , 7.0, PF, LF

## (undated) DEVICE — ELECTRODE, EZ CLEAN LAPAROSCOPIC L-WIRE, 33CM, DISP

## (undated) DEVICE — NEEDLE, HYPODERMIC, MONOJECT, 25 G X 1.5 IN, LUER LOCK HUB, RED

## (undated) DEVICE — SYRINGE, 10 CC, LUER LOCK

## (undated) DEVICE — DRAPE, TIBURON W/ADHESIVE, 19 X 30

## (undated) DEVICE — SLEEVE, KII, Z-THREAD, 5X100CM

## (undated) DEVICE — DRAPE, C-ARM IMAGE

## (undated) DEVICE — TROCAR, KII OPTICAL BLADELESS 5MM Z THREAD 100MM LNGTH

## (undated) DEVICE — Device

## (undated) DEVICE — SYRINGE, 50 CC, LUER LOCK

## (undated) DEVICE — EXTENSION SET, IV, MACROBORE, 30IN, NON DEHP

## (undated) DEVICE — DRAPE, SHEET, LARGE, 70 X 85IN, STERILE

## (undated) DEVICE — GOWN, SURGICAL, SIRUS, NON REINFORCED, LARGE

## (undated) DEVICE — SOLUTION, INJECTION, USP, LACTATED RINGERS, LIFECARE, 1000ML

## (undated) DEVICE — SUTURE, MONOCRYL, 4-0, 27 IN, PS-2, UNDYED